# Patient Record
Sex: FEMALE | Race: WHITE | NOT HISPANIC OR LATINO | URBAN - METROPOLITAN AREA
[De-identification: names, ages, dates, MRNs, and addresses within clinical notes are randomized per-mention and may not be internally consistent; named-entity substitution may affect disease eponyms.]

---

## 2019-09-03 ENCOUNTER — OFFICE VISIT (OUTPATIENT)
Dept: FAMILY MEDICINE CLINIC | Facility: CLINIC | Age: 34
End: 2019-09-03

## 2019-09-03 VITALS
HEIGHT: 66 IN | HEART RATE: 82 BPM | RESPIRATION RATE: 16 BRPM | OXYGEN SATURATION: 99 % | TEMPERATURE: 97.9 F | DIASTOLIC BLOOD PRESSURE: 60 MMHG | BODY MASS INDEX: 30.53 KG/M2 | SYSTOLIC BLOOD PRESSURE: 88 MMHG | WEIGHT: 190 LBS

## 2019-09-03 DIAGNOSIS — Z00.00 WELLNESS EXAMINATION: Primary | ICD-10-CM

## 2019-09-03 DIAGNOSIS — Z00.00 LABORATORY EXAM ORDERED AS PART OF ROUTINE GENERAL MEDICAL EXAMINATION: ICD-10-CM

## 2019-09-03 PROCEDURE — 99385 PREV VISIT NEW AGE 18-39: CPT | Performed by: NURSE PRACTITIONER

## 2019-09-03 RX ORDER — MELATONIN
6000 DAILY
COMMUNITY

## 2019-09-03 RX ORDER — CETIRIZINE HYDROCHLORIDE 5 MG/1
5 TABLET ORAL DAILY
COMMUNITY

## 2019-09-03 RX ORDER — FAMOTIDINE 20 MG/1
TABLET, FILM COATED ORAL
COMMUNITY

## 2019-09-03 RX ORDER — PRENATAL VIT NO.126/IRON/FOLIC 28MG-0.8MG
TABLET ORAL DAILY
COMMUNITY

## 2019-09-03 RX ORDER — CEPHALEXIN 500 MG/1
500 CAPSULE ORAL DAILY
COMMUNITY
End: 2020-05-11

## 2019-09-03 NOTE — PROGRESS NOTES
Subjective   Migdalia HUERTA is a 34 y.o. female.     History of Present Illness   Migdalia HUERTA 34 y.o. female who presents today for a new patient appointment.    she has a history of There is no problem list on file for this patient.  .  she is here to establish care I reviewed the PFSH recorded today by my MA/LPN staff.   she   She has been feeling well until recent illness.  Migdalia HUERTA 34 y.o. female who presents for evaluation of upper respiratory congestion. Symptoms include post nasal drip, chills and fatigue.  Onset of symptoms was 3 days ago, gradually improving since that time. Patient denies shortness of breath, wheezing, fever.        Patient has hx of ADD but is not currently taking any medications for this.  She feels that she is functioning well off medication.     She is currently breastfeeding her 3 month old son.  She is taking prenatal and 6000 units of vitamin D daily.  She uses Zyrtec daily for seasonal allergies and pepcid for GERD.      She has Keflex the she takes daily for acne per dermatology.      She does not need any medications filled at this time.   The following portions of the patient's history were reviewed and updated as appropriate: allergies, current medications, past family history, past medical history, past social history, past surgical history and problem list.    Review of Systems   Constitutional: Negative for unexpected weight change.   Respiratory: Negative for shortness of breath.    Cardiovascular: Negative for chest pain and palpitations.   Endocrine: Negative for cold intolerance, heat intolerance, polydipsia, polyphagia and polyuria.   Psychiatric/Behavioral: Negative for behavioral problems.       Objective   Physical Exam   Constitutional: She is oriented to person, place, and time. She appears well-developed and well-nourished.   HENT:   Right Ear: Tympanic membrane, external ear and ear canal normal.   Left Ear: Tympanic membrane, external  ear and ear canal normal.   Nose: No mucosal edema or rhinorrhea.   Mouth/Throat: Uvula is midline and oropharynx is clear and moist.   Cardiovascular: Normal rate and regular rhythm.   Pulmonary/Chest: Effort normal and breath sounds normal.   Neurological: She is alert and oriented to person, place, and time.   Skin: Skin is warm.   Psychiatric: She has a normal mood and affect. Judgment normal.   Nursing note and vitals reviewed.      Assessment/Plan   Migdalia was seen today for establish care.    Diagnoses and all orders for this visit:    Wellness examination  -     Comprehensive metabolic panel  -     Lipid panel  -     CBC and Differential  -     TSH    Laboratory exam ordered as part of routine general medical examination  -     Comprehensive metabolic panel  -     Lipid panel  -     CBC and Differential  -     TSH